# Patient Record
Sex: FEMALE | Race: WHITE | NOT HISPANIC OR LATINO | Employment: UNEMPLOYED | URBAN - METROPOLITAN AREA
[De-identification: names, ages, dates, MRNs, and addresses within clinical notes are randomized per-mention and may not be internally consistent; named-entity substitution may affect disease eponyms.]

---
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
## 2023-06-08 ENCOUNTER — OFFICE VISIT (OUTPATIENT)
Dept: FAMILY MEDICINE CLINIC | Facility: CLINIC | Age: 23
End: 2023-06-08
Payer: COMMERCIAL

## 2023-06-08 VITALS
SYSTOLIC BLOOD PRESSURE: 114 MMHG | TEMPERATURE: 98.2 F | HEIGHT: 59 IN | WEIGHT: 132.6 LBS | OXYGEN SATURATION: 99 % | RESPIRATION RATE: 18 BRPM | DIASTOLIC BLOOD PRESSURE: 54 MMHG | HEART RATE: 75 BPM | BODY MASS INDEX: 26.73 KG/M2

## 2023-06-08 DIAGNOSIS — R19.7 DIARRHEA OF PRESUMED INFECTIOUS ORIGIN: Primary | ICD-10-CM

## 2023-06-08 DIAGNOSIS — B89 PARASITE INFECTION: ICD-10-CM

## 2023-06-08 PROCEDURE — 99213 OFFICE O/P EST LOW 20 MIN: CPT | Performed by: PHYSICIAN ASSISTANT

## 2023-06-08 RX ORDER — METRONIDAZOLE 500 MG/1
500 TABLET ORAL 2 TIMES DAILY
Qty: 10 TABLET | Refills: 0 | Status: SHIPPED | OUTPATIENT
Start: 2023-06-08

## 2023-06-08 NOTE — PROGRESS NOTES
"Subjective   Sugar REI Ta is a 22 y.o. female.     History of Present Illness   Pt presents with CC of GI symptoms   [place] Got back on 6/2/23  Started with diarrhea the last couple day. 4-5 episodes of watery brown stool. No blood with BM. Some mucus   Some abdominal cramping. Dull today. Feels relief with bowel movement   Has started to now feel constipated. Straining this AM. Did pass small amount of soft stools   Started noticing white worm looking things in stool yesterday   Notes that some members of her group were diagnosed with a parasite.   No weight changes, fever, or night sweats   No nausea or vomiting    No change in appetite   States she accidentally drank the water once instead of using bottled water.       The following portions of the patient's history were reviewed and updated as appropriate: allergies, current medications, past family history, past medical history, past social history, past surgical history, and problem list.    Review of Systems  As noted per HPI     Objective   Blood pressure 114/54, pulse 75, temperature 98.2 °F (36.8 °C), resp. rate 18, height 149.9 cm (59\"), weight 60.1 kg (132 lb 9.6 oz), SpO2 99 %.   Physical Exam  Vitals and nursing note reviewed.   Constitutional:       Appearance: Normal appearance.   HENT:      Head: Normocephalic.      Right Ear: Tympanic membrane, ear canal and external ear normal.      Left Ear: Tympanic membrane, ear canal and external ear normal.      Nose: Nose normal.      Mouth/Throat:      Pharynx: No oropharyngeal exudate or posterior oropharyngeal erythema.   Eyes:      Conjunctiva/sclera: Conjunctivae normal.   Cardiovascular:      Rate and Rhythm: Normal rate and regular rhythm.   Pulmonary:      Effort: Pulmonary effort is normal.      Breath sounds: Normal breath sounds.   Abdominal:      Tenderness:  in the epigastric area   Skin:     General: Skin is warm and dry.   Neurological:      Mental Status: She is alert and " oriented to person, place, and time.   Psychiatric:         Mood and Affect: Mood normal.         Behavior: Behavior normal.       Assessment & Plan   Diagnoses and all orders for this visit:    1. Diarrhea of presumed infectious origin (Primary)  -     Ova & Parasite Examination - Stool, Per Rectum  -     metroNIDAZOLE (Flagyl) 500 MG tablet; Take 1 tablet by mouth 2 (Two) Times a Day.  Dispense: 10 tablet; Refill: 0    2. Parasite infection  -     metroNIDAZOLE (Flagyl) 500 MG tablet; Take 1 tablet by mouth 2 (Two) Times a Day.  Dispense: 10 tablet; Refill: 0    Check stool study as noted   Cover with flagyl due to suspected parasite infection   Report to ER if new or worsening symptoms develop

## 2023-06-14 ENCOUNTER — TELEPHONE (OUTPATIENT)
Dept: FAMILY MEDICINE CLINIC | Facility: CLINIC | Age: 23
End: 2023-06-14
Payer: COMMERCIAL

## 2023-06-14 NOTE — TELEPHONE ENCOUNTER
Caller: Sugar Ta    Relationship: Self    Best call back number: [phone]    What is the best time to reach you: ANYTIME    Who are you requesting to speak with (clinical staff, provider,  specific staff member): CLINICAL - DR NA FUENTES      What was the call regarding: PATIENT REQUESTS HER PARASITE TEST RESULTS.  SHE STATED THAT SHE IS ON THE LAST DAY OF ANTIBIOTICS AND HER SYMPTOMS REMAIN THE SAME.  PLEASE ADVISE HER OF HER LAB RESULTS ONCE YOU'VE REVIEWED THEM.  THANK YOU!

## 2023-06-15 LAB
O+P SPEC MICRO: NORMAL
O+P STL CONC: NORMAL